# Patient Record
Sex: FEMALE
[De-identification: names, ages, dates, MRNs, and addresses within clinical notes are randomized per-mention and may not be internally consistent; named-entity substitution may affect disease eponyms.]

---

## 2018-09-24 ENCOUNTER — HOSPITAL ENCOUNTER (OUTPATIENT)
Dept: HOSPITAL 42 - ENDO | Age: 43
Discharge: HOME | End: 2018-09-24
Payer: MEDICAID

## 2018-09-24 VITALS
HEART RATE: 68 BPM | DIASTOLIC BLOOD PRESSURE: 68 MMHG | SYSTOLIC BLOOD PRESSURE: 105 MMHG | RESPIRATION RATE: 16 BRPM | OXYGEN SATURATION: 99 %

## 2018-09-24 VITALS — BODY MASS INDEX: 28.3 KG/M2

## 2018-09-24 VITALS — TEMPERATURE: 98.2 F

## 2018-09-24 DIAGNOSIS — K59.09: ICD-10-CM

## 2018-09-24 DIAGNOSIS — Z12.11: Primary | ICD-10-CM

## 2018-09-24 DIAGNOSIS — K29.50: ICD-10-CM

## 2018-09-24 DIAGNOSIS — R11.2: ICD-10-CM

## 2018-09-24 DIAGNOSIS — K64.8: ICD-10-CM

## 2018-09-24 PROCEDURE — 45378 DIAGNOSTIC COLONOSCOPY: CPT

## 2018-09-24 PROCEDURE — 43239 EGD BIOPSY SINGLE/MULTIPLE: CPT

## 2018-09-24 PROCEDURE — 88342 IMHCHEM/IMCYTCHM 1ST ANTB: CPT

## 2018-09-24 PROCEDURE — 88305 TISSUE EXAM BY PATHOLOGIST: CPT

## 2018-09-24 PROCEDURE — 84703 CHORIONIC GONADOTROPIN ASSAY: CPT

## 2018-09-24 NOTE — CP.SDSHP
Same Day [facility]





- History


Proposed Procedure: egd and colonoscopy


Pre-Op Diagnosis: intractable nausea and vomiting, chronic constipation





- Allergies


Allergies: 


Allergies





No Known Allergies Allergy (Verified 09/24/18 09:20)


 











- Physical Exam


General Appearance: no acute distress


Vital Signs: 


 Vital Signs











  09/24/18





  08:45


 


Pulse Rate 77


 


Respiratory 18





Rate 


 


Blood Pressure 116/63


 


O2 Sat by Pulse 98





Oximetry 











Mental Status: Alert & Oriented x3


Neuro: WNL


Heart: WNL


Lungs: WNL


GI: WNL





- {Optional Preform as Required}


Abdomen: Other (nausea, and pain)


Integument: WNL


Ortho: WNL





- Impression


Impression: intractable nausea and vomiting, constipation


Pt. Evaluated Today:Candidate for Anesthesia & Procedure: Yes





- Date & Time


Date: 09/24/18


Time: 09:00





Short Stay Discharge





- Short Stay Discharge


Admitting Diagnosis/Reason for Visit: G43.A0/K59.01


Disposition: HOME/ ROUTINE